# Patient Record
Sex: FEMALE | Race: WHITE | Employment: PART TIME | URBAN - METROPOLITAN AREA
[De-identification: names, ages, dates, MRNs, and addresses within clinical notes are randomized per-mention and may not be internally consistent; named-entity substitution may affect disease eponyms.]

---

## 2017-02-21 ENCOUNTER — TRANSFERRED RECORDS (OUTPATIENT)
Dept: HEALTH INFORMATION MANAGEMENT | Facility: CLINIC | Age: 62
End: 2017-02-21

## 2017-10-03 ENCOUNTER — TRANSFERRED RECORDS (OUTPATIENT)
Dept: HEALTH INFORMATION MANAGEMENT | Facility: CLINIC | Age: 62
End: 2017-10-03

## 2018-10-30 ENCOUNTER — TELEPHONE (OUTPATIENT)
Dept: ALLERGY | Facility: OTHER | Age: 63
End: 2018-10-30

## 2018-10-30 ENCOUNTER — OFFICE VISIT (OUTPATIENT)
Dept: ALLERGY | Facility: OTHER | Age: 63
End: 2018-10-30
Payer: COMMERCIAL

## 2018-10-30 VITALS
OXYGEN SATURATION: 95 % | SYSTOLIC BLOOD PRESSURE: 106 MMHG | HEART RATE: 75 BPM | DIASTOLIC BLOOD PRESSURE: 60 MMHG | TEMPERATURE: 99.3 F

## 2018-10-30 DIAGNOSIS — L50.9 HIVES: Primary | ICD-10-CM

## 2018-10-30 DIAGNOSIS — L20.89 OTHER ATOPIC DERMATITIS: ICD-10-CM

## 2018-10-30 PROCEDURE — 99204 OFFICE O/P NEW MOD 45 MIN: CPT | Performed by: ALLERGY & IMMUNOLOGY

## 2018-10-30 RX ORDER — TRIAMCINOLONE ACETONIDE 1 MG/G
CREAM TOPICAL
Qty: 454 G | Refills: 1 | Status: SHIPPED | OUTPATIENT
Start: 2018-10-30

## 2018-10-30 NOTE — PATIENT INSTRUCTIONS
Allergy Staff Appt Hours Shot Hours Locations    Physician     Alan Moyer, DO       Support Staff     Jerrica DANIELSON RN      Melinda DANIELSON, [facility]  Monday:                      [place] 8-7     Tuesday:         [place] 8-5     Wednesday:        [place]: 7-5     Friday:        [place] 7-5   [place]        Monday: 9-5:50        Wednesday: 2-5:50        Friday: 7-12:50     [place]        Tuesday: 7-10:50        Thursday: 1:30-6:30     [place] Monday: 7:10-4:50        Tuesday: 12:30-6:30        Thursday: 7-11:50 [facility]  [address] [place] [address]  Appt Line: [phone]  Allergy RN (Monday):  [phone]    [facility]  [address] [place] [address]  Appt Line: [phone]  Allergy RN (Tues & Wed):  [phone]    [facility]  [address] [place] [address]  Appt Line: [phone]  Allergy RN (Friday):  [phone]       Important Scheduling Information  Aspirin Desensitization: Appt will last 2 clinic days. Please call the Allergy RN line for your clinic to schedule. Discontinue antihistamines 7 days prior to the appointment.     Food Challenges: Appt will last 3-4 hours. Please call the Allergy RN line for your clinic to schedule. Discontinue antihistamines 7 days prior to the appointment.     Penicillin Testing: Appt will last 2-3 hours. Please call the Allergy RN line for your clinic to schedule. Discontinue antihistamines 7 days prior to the appointment.     Skin Testing: Appt will about 40 minutes. Call the appointment line for your clinic to schedule. Discontinue antihistamines 7 days prior to the appointment.     Venom Testing: Appt will last 2-3 hours. Please call the Allergy RN line for your clinic to schedule. Discontinue antihistamines 7 days prior to the appointment.     Thank you for trusting us with your Allergy, Asthma, and Immunology care. Please feel free to contact us with any questions or concerns you may have.      - Return to clinic  for food allergy testing. Please bring in seasoning that suspect is implicated in reaction.  Hold Zyrtec, Allegra, Claritin, Benadryl for 7 days prior to skin testing appointment.   - See eczema treatment instructions noted below.  - Stop using Young Living products. Use all fragrance free products.      Eczema Treatment Instructions     Moisturize the skin: This is the first and most important step.  Thick, greasy ointments work best: Vaseline jelly, Eucerin, Aquaphor, etc.    Apply to entire body at least twice a day, up to several times per day when the air is dry (as in late fall, winter, early spring)    If using steroid ointments, apply these first; allow to dry before applying moisturizer over the steroid ointment.    Bathing:  Bathe DAILY.    Use as little soap as possible, and very mild soaps.  Wash dirty areas with soap first, rinse off the soap, then fill the tub with clean water.    Soak in lukewarm water for 20-30 minutes    Pat dry gently, and immediately apply moisturizer while the skin is still damp    Steroid ointments:    Hydrocortisone, 1% for rashes on the face and groin area.    Stronger steroid ointment for the rest of the body:Triamcinolone 0.1% cream. Apply twice daily, only to areas of rash (do not use the steroid ointment as a moisturizer).           Antihistamine treatment: important to help prevent itching/scratching    Zyrtec 10mg at bedtime         Avoidance measures: Avoid exposure to things that make eczema worse   Dust mite   Rough or scratchy clothing    Harsh soaps or detergents  AEROALLERGEN AVOIDANCE INSTRUCTIONS  MOLD  Indoors, mold season is year round. Outdoors, most mold prefer seasons with high humidity. Mold prefers damp, dark, warm places. Here are some tips on how to avoid mold exposure.  1.  Keep humidity inside between 35-50% with air conditioning or dehumidifier. The humidity level can be checked with a meter from a hardware store.   2.  Clean surfaces where mold grows  and dry wet areas.  3.  Avoid steam cleaning carpets and discard moldy belongings.  4.  Wear a mask when doing yard work and refrain from walking through uncut fields or playing in leaves.  5.  Minimize use of potted plants and do not keep them indoors.  6.  Consider an allergy cover for the pillow and mattress.  DUST MITES  Dust mites can never be entirely eliminated in the house no matter how clean your house is. Dust mites are attracted to warm, moist areas and feed on dead skin flakes. Here are tips to minimize dust mites in your home.  1.  Encase pillows and mattress/box springs in zippered allergy covers.  2.  Wash bedding in hot water (at least 130 F) every 7-14 days.  3.  Avoid curtains, carpet, and upholstered furniture if possible.  4.  Use HEPA air filters and a HEPA filter vacuum . Change filters monthly. Vacuum weekly.  5.  Keep bedroom simple, avoiding clutter, so it can quickly be dusted.  6.  Cover heating vents with vent filters.  7.  Keep stuffed toys in a closed container and wash or freeze regularly.  8.  Keep clothing in the closet with the door closed.  PETS  Pets present many problems for people with allergies. Dander from pets is very difficult to remove and also is a food source for dust mites.  1.  If possible, find the pet a new home.  2.  If not possible, keep the pet outdoors. Never allow the pet into the bedroom.  3.  Wash pet weekly in warm water.  4.  Encase mattresses, pillows, and box springs in allergen-proof covers.  5.  Use HEPA air filters and a HEPA filter vacuum . Change filters monthly.

## 2018-10-30 NOTE — ASSESSMENT & PLAN NOTE
Raised, erythematous, bumpy rash on chest that occurred within 20 minutes if eating at a taco bar which contained beef, chicken, lettuce, tomato, corn, flour and taco seasoning.  Rash persisted 1.5 weeks.  Rash improved when she started taking Zyrtec.  No scarring or discoloration.  This rash was different than eczematous rash that she has dealt with for 4 years.  Subsequently she has tolerated beef, chicken.  She is concerned about taco seasoning being the culprit.  No other IgE mediated symptoms.  Unclear if rash was IgE mediated.  Did occur immediately after consuming these foods without other clear etiology but persistence for 1.5 weeks would be unusual.    -I am going to have the patient return to clinic for allergy testing for potential foods implicated including taco seasoning.  Could not do today secondary to antihistamine use.  Continue to avoid potential implicated foods until after testing.

## 2018-10-30 NOTE — TELEPHONE ENCOUNTER
Release of information was faxed on 10/30/18 at 16:14 PM to patient's previous allergy office, [facility] (Dr. Kessler), @ [phone], located in [place], [place]. The phone number for this location is [phone]. Requested that records be faxed to [facility] @ [phone]. This patient is not transferring care to our office for allergen immunotherapy injections. Specific items requested include clinic/physician notes. Will follow up in two weeks if records have not been received.     Jerrica Almazan RN

## 2018-10-30 NOTE — PROGRESS NOTES
Monika Luis is a 63 year old White female with previous medical history significant for eczema. Monika Luis is being seen today for evaluation of eczema.     Patient presents as a new patient.  She has been seen by an allergist in 2017.  She at that time was having erythematous, rough, dry skin rash and had allergy testing done which showed she was positive for dust mites, molds and cats.  She has been using triamcinolone 0.1% cream on an as-needed basis.  She has found this to be beneficial.  She historically has been using young living oils and homemade oils as an emollient.  She switched to Cerave recently and has been using 2-3 times per day.  She is still using young living supplements, diffusers and occasionally a topical oil. She thinks that young living products exacerbate her skin.  She will get rash on arms, legs, face and body.  This is been prevalent for the last 4 years and occurs every day.  On her hands she will get cracking, vesicles and pruritic dermatitis.  Detergent contains fragrance.  She is using young living shampoo and soap.  She reports that recently she had a different rash on her chest that she describes as erythematous, itchy bumps that occurred after eating Mexican food (tacos) which consisted of beef, chicken, lettuce, tomato, flower tortilla shells, corn tortilla shells, taco seasoning.  Subsequently she has had chicken and beef.  Rash persisted for 1.5 weeks.  Rash occurred within 20 minutes of eating.  No history of IgE mediated food allergy.  No other IgE mediated symptoms.  She denies rhinorrhea, nasal itching, sneezing.  She denies asthma.  She denies other food allergy concerns.    History reviewed. No pertinent past medical history.  History reviewed. No pertinent family history.  History reviewed. No pertinent surgical history.    REVIEW OF SYSTEMS:  General: negative for weight gain. negative for weight loss. negative for changes in sleep.   Ears: negative for  fullness. negative for hearing loss. negative for dizziness.   Nose: positive  for snoring.positive  for changes in smell. negative for drainage.   Eyes: negative for eye watering. negative for eye itching. negative for vision changes. negative for eye redness.  Throat: negative for hoarseness. negative for sore throat. negative for trouble swallowing.   Lungs: negative for shortness of breath.negative for wheezing. negative for sputum production.   Cardiovascular: negative for chest pain. negative for swelling of ankles. negative for fast or irregular heartbeat.   Gastrointestinal: negative for nausea. negative for heartburn. negative for acid reflux.   Musculoskeletal: positive  for joint pain. positive  for joint stiffness. positive  for joint swelling.   Neurologic: negative for seizures. negative for fainting. negative for weakness.   Psychiatric: negative for changes in mood. negative for anxiety.   Endocrine: negative for cold intolerance. negative for heat intolerance. negative for tremors.   Lymphatic: negative for lower extremity swelling. negative for lymph node swelling.   Hematologic: positive  for easy bruising. negative for easy bleeding.  Integumentary: positive  for rash. positive  for scaling. negative for nail changes.       Current Outpatient Prescriptions:      DiphenhydrAMINE HCl (BENADRYL PO), Take 50 mg by mouth daily as needed, Disp: , Rfl:      triamcinolone (KENALOG) 0.1 % cream, Apply sparingly to affected area two times daily as needed, Disp: 454 g, Rfl: 1     Tea Tree Oil OIL, Combined with clove, Disp: , Rfl:   Immunization History   Administered Date(s) Administered     Influenza (IIV3) PF 11/04/2006     Allergies   Allergen Reactions     Contrast Dye Anaphylaxis         EXAM:   Constitutional:  Appears well-developed and well-nourished. No distress.   HEENT:   Head: Normocephalic.   Mouth/Throat:   No cobblestoning of posterior oropharynx.   Nasal tissue pink and normal  appearing.  No rhinorrhea noted.    Eyes: Conjunctivae are non-erythematous   No maxillary or frontal sinus tenderness to palpation.   Cardiovascular: Normal rate, regular rhythm and normal heart sounds. Exam reveals no gallop and no friction rub.   No murmur heard.  Respiratory: Effort normal and breath sounds normal. No respiratory distress. No wheezes. No rales.   Musculoskeletal: Normal range of motion.   Lymphadenopathy:   No cervical adenopathy.   No lower extremity edema.   Neuro: Oriented to person, place, and time.  Skin: Rough, dry, erythematous, vesicular rash noted on palmar surface of right thumb, lateral surfaces of multiple fingers bilaterally.  She additionally had rough, dry, erythematous rash noted on bilateral upper extremities.  Dry, erythematous, rough rash noted minimally on chest.  Psychiatric: Normal mood and affect.     Nursing note and vitals reviewed.        ASSESSMENT/PLAN:  Problem List Items Addressed This Visit        Musculoskeletal and Integumentary    Other atopic dermatitis     Dermatitis that is rough, erythematous, dry and itching for the last 4 years. Present most days. On arms, legs and chest. Triamcinolone 0.1% cream used and helpful. Using Cerave 2-3 times daily. Using Young Living Products and supplements. She thinks this makes her skin worse. No dermatology evaluation. Per her history skin testing done by outside allergist positive for dust mites, molds, and cats.  Dry, cracked, vesicular rash on hands concerning for dyshidrotic eczema.    - Eczema treatment instructions were discussed with the patient and literature provided.    - Daily bathing.   - Use of thick emollient such as Eucerin, Aquaphor, Vanicream or Vaseline 2-3 times daily.   - Soak and seal technique was discussed.    - Avoid harsh soaps and detergents. Use fragrance free.    - Triamcinolone 0.1% cream bid to active eczema on body.    - Hydrocortisone 1% cream bid to active eczema on face.    - Zyrtec 10mg PO  daily for itching control   - Referral to dermatology to consider patch testing given fragrances exacerbate her symptoms.    - Stop using Young Living products.    - Allergen avoidance measures discussed based on what she told me she was positive for on allergy testing.    - Sign release of information to get records from outside allergist.            Relevant Medications    triamcinolone (KENALOG) 0.1 % cream    DiphenhydrAMINE HCl (BENADRYL PO)    Other Relevant Orders    DERMATOLOGY REFERRAL    Hives - Primary     Raised, erythematous, bumpy rash on chest that occurred within 20 minutes if eating at a taco bar which contained beef, chicken, lettuce, tomato, corn, flour and taco seasoning.  Rash persisted 1.5 weeks.  Rash improved when she started taking Zyrtec.  No scarring or discoloration.  This rash was different than eczematous rash that she has dealt with for 4 years.  Subsequently she has tolerated beef, chicken.  She is concerned about taco seasoning being the culprit.  No other IgE mediated symptoms.  Unclear if rash was IgE mediated.  Did occur immediately after consuming these foods without other clear etiology but persistence for 1.5 weeks would be unusual.    -I am going to have the patient return to clinic for allergy testing for potential foods implicated including taco seasoning.  Could not do today secondary to antihistamine use.  Continue to avoid potential implicated foods until after testing.             Relevant Medications    triamcinolone (KENALOG) 0.1 % cream    DiphenhydrAMINE HCl (BENADRYL PO)          Chart documentation with Dragon Voice recognition Software. Although reviewed after completion, some words and grammatical errors may remain.    Alan Moyer,    Allergy/Immunology  [facility], [place] and [place], [place]

## 2018-10-30 NOTE — MR AVS SNAPSHOT
After Visit Summary   10/30/2018    Monika Luis    MRN: [id number]           Patient Information     Date Of Birth          1955        Visit Information        Provider Department      10/30/2018 3:20 PM Alan Moyer DO [facility]        Today's Diagnoses     Other atopic dermatitis          Care Instructions    Allergy Staff Appt Hours Shot Hours Locations    Physician     Alan Moyer DO       Support Staff     Jerrica DANIELSON RN      Melinda DANIELSON, [facility]  Monday:                      [place] 8-7     Tuesday:         [place] 8-5     Wednesday:        [place]: 7-5     Friday:        [place] 7-5   [place] Monday: 9-5:50        Wednesday: 2-5:50        Friday: 7-12:50     [place]        Tuesday: 7-10:50        Thursday: 1:30-6:30     [place] Monday: 7:10-4:50        Tuesday: 12:30-6:30        Thursday: 7-11:50 [facility]  [address] [place] [address]  Appt Line: [phone]  Allergy RN (Monday):  [phone]    [facility]  [address] [place] [address]  Appt Line: [phone]  Allergy RN (Tues & Wed):  [phone]    [facility]  [address] [place] [address]  Appt Line: [phone]  Allergy RN (Friday):  [phone]       Important Scheduling Information  Aspirin Desensitization: Appt will last 2 clinic days. Please call the Allergy RN line for your clinic to schedule. Discontinue antihistamines 7 days prior to the appointment.     Food Challenges: Appt will last 3-4 hours. Please call the Allergy RN line for your clinic to schedule. Discontinue antihistamines 7 days prior to the appointment.     Penicillin Testing: Appt will last 2-3 hours. Please call the Allergy RN line for your clinic to schedule. Discontinue antihistamines 7 days prior to the appointment.     Skin Testing: Appt will about 40 minutes. Call the appointment line for your clinic to schedule. Discontinue antihistamines 7 days prior  to the appointment.     Venom Testing: Appt will last 2-3 hours. Please call the Allergy RN line for your clinic to schedule. Discontinue antihistamines 7 days prior to the appointment.     Thank you for trusting us with your Allergy, Asthma, and Immunology care. Please feel free to contact us with any questions or concerns you may have.      - Return to clinic for food allergy testing. Please bring in seasoning that suspect is implicated in reaction.  Hold Zyrtec, Allegra, Claritin, Benadryl for 7 days prior to skin testing appointment.   - See eczema treatment instructions noted below.  - Stop using Young Living products. Use all fragrance free products.      Eczema Treatment Instructions     Moisturize the skin: This is the first and most important step.  Thick, greasy ointments work best: Vaseline jelly, Eucerin, Aquaphor, etc.    Apply to entire body at least twice a day, up to several times per day when the air is dry (as in late fall, winter, early spring)    If using steroid ointments, apply these first; allow to dry before applying moisturizer over the steroid ointment.    Bathing:  Bathe DAILY.    Use as little soap as possible, and very mild soaps.  Wash dirty areas with soap first, rinse off the soap, then fill the tub with clean water.    Soak in lukewarm water for 20-30 minutes    Pat dry gently, and immediately apply moisturizer while the skin is still damp    Steroid ointments:    Hydrocortisone, 1% for rashes on the face and groin area.    Stronger steroid ointment for the rest of the body:Triamcinolone 0.1% cream. Apply twice daily, only to areas of rash (do not use the steroid ointment as a moisturizer).           Antihistamine treatment: important to help prevent itching/scratching    Zyrtec 10mg at bedtime         Avoidance measures: Avoid exposure to things that make eczema worse   Dust mite   Rough or scratchy clothing    Harsh soaps or detergents  AEROALLERGEN AVOIDANCE  INSTRUCTIONS  MOLD  Indoors, mold season is year round. Outdoors, most mold prefer seasons with high humidity. Mold prefers damp, dark, warm places. Here are some tips on how to avoid mold exposure.  1.  Keep humidity inside between 35-50% with air conditioning or dehumidifier. The humidity level can be checked with a meter from a hardware store.   2.  Clean surfaces where mold grows and dry wet areas.  3.  Avoid steam cleaning carpets and discard moldy belongings.  4.  Wear a mask when doing yard work and refrain from walking through uncut fields or playing in leaves.  5.  Minimize use of potted plants and do not keep them indoors.  6.  Consider an allergy cover for the pillow and mattress.  DUST MITES  Dust mites can never be entirely eliminated in the house no matter how clean your house is. Dust mites are attracted to warm, moist areas and feed on dead skin flakes. Here are tips to minimize dust mites in your home.  1.  Encase pillows and mattress/box springs in zippered allergy covers.  2.  Wash bedding in hot water (at least 130 F) every 7-14 days.  3.  Avoid curtains, carpet, and upholstered furniture if possible.  4.  Use HEPA air filters and a HEPA filter vacuum . Change filters monthly. Vacuum weekly.  5.  Keep bedroom simple, avoiding clutter, so it can quickly be dusted.  6.  Cover heating vents with vent filters.  7.  Keep stuffed toys in a closed container and wash or freeze regularly.  8.  Keep clothing in the closet with the door closed.  PETS  Pets present many problems for people with allergies. Dander from pets is very difficult to remove and also is a food source for dust mites.  1.  If possible, find the pet a new home.  2.  If not possible, keep the pet outdoors. Never allow the pet into the bedroom.  3.  Wash pet weekly in warm water.  4.  Encase mattresses, pillows, and box springs in allergen-proof covers.  5.  Use HEPA air filters and a HEPA filter vacuum . Change filters  monthly.                Follow-ups after your visit        Additional Services     DERMATOLOGY REFERRAL       Your provider has referred you to: [facility] [phone]   [email or web address]  [place] [phone]   [email or web address]    Please be aware that coverage of these services is subject to the terms and limitations of your health insurance plan.  Call member services at your health plan with any benefit or coverage questions.      Please bring the following with you to your appointment:    (1) Any X-Rays, CTs or MRIs which have been performed.  Contact the facility where they were done to arrange for  prior to your scheduled appointment.    (2) List of current medications  (3) This referral request   (4) Any documents/labs given to you for this referral                  Who to contact     If you have questions or need follow up information about today's clinic visit or your schedule please contact [facility] directly at [phone].  Normal or non-critical lab and imaging results will be communicated to you by MyChart, letter or phone within 4 business days after the clinic has received the results. If you do not hear from us within 7 days, please contact the clinic through MyChart or phone. If you have a critical or abnormal lab result, we will notify you by phone as soon as possible.  Submit refill requests through [employer] or call your pharmacy and they will forward the refill request to us. Please allow 3 business days for your refill to be completed.          Additional Information About Your Visit        Care EveryWhere ID     This is your Care EveryWhere ID. This could be used by other organizations to access your [facility] medical records  [id number]        Your Vitals Were     Pulse Temperature Pulse Oximetry             75 99.3  F (37.4  C) (Oral) 95%          Blood Pressure from Last 3 Encounters:    10/30/18 106/60   06/18/16 116/72    Weight from Last 3 Encounters:   No data found for Wt              We Performed the Following     DERMATOLOGY REFERRAL          Today's Medication Changes          These changes are accurate as of 10/30/18  4:12 PM.  If you have any questions, ask your nurse or doctor.               These medicines have changed or have updated prescriptions.        Dose/Directions    triamcinolone 0.1 % cream   Commonly known as:  KENALOG   This may have changed:  additional instructions   Used for:  Other atopic dermatitis   Changed by:  Alan Moyer, DO        Apply sparingly to affected area two times daily as needed   Quantity:  454 g   Refills:  1            Where to get your medicines      These medications were sent to [facility] [address] [place] [address]     Phone:  [phone]     triamcinolone 0.1 % cream                Primary Care Provider Fax #    Physician No Ref-Primary [phone]       No address on file        Equal Access to Services     RUKHSANA CABRAL AH: Hadii aad ku hadasho Soomaali, waaxda luqadaha, qaybta kaalmada adeegyada, faviola shanks . So [facility] [phone].    ATENCIÓN: Si habla español, tiene a rodas disposición servicios gratuitos de asistencia lingüística. Llame al [phone].    We comply with applicable federal civil rights laws and [place] laws. We do not discriminate on the basis of race, color, national origin, age, disability, sex, sexual orientation, or gender identity.            Thank you!     Thank you for choosing [facility]  for your care. Our goal is always to provide you with excellent care. Hearing back from our patients is one way we can continue to improve our services. Please take a few minutes to complete the written survey that you may receive in the mail after your visit with us. Thank you!             Your Updated Medication List -  Protect others around you: Learn how to safely use, store and throw away your medicines at www.disposemymeds.org.          This list is accurate as of 10/30/18  4:12 PM.  Always use your most recent med list.                   Brand Name Dispense Instructions for use Diagnosis    BENADRYL PO      Take 50 mg by mouth daily as needed        Tea Tree Oil Oil      Combined with clove        triamcinolone 0.1 % cream    KENALOG    454 g    Apply sparingly to affected area two times daily as needed    Other atopic dermatitis

## 2018-10-30 NOTE — ASSESSMENT & PLAN NOTE
Dermatitis that is rough, erythematous, dry and itching for the last 4 years. Present most days. On arms, legs and chest. Triamcinolone 0.1% cream used and helpful. Using Cerave 2-3 times daily. Using Young Living Products and supplements. She thinks this makes her skin worse. No dermatology evaluation. Per her history skin testing done by outside allergist positive for dust mites, molds, and cats.  Dry, cracked, vesicular rash on hands concerning for dyshidrotic eczema.    - Eczema treatment instructions were discussed with the patient and literature provided.    - Daily bathing.   - Use of thick emollient such as Eucerin, Aquaphor, Vanicream or Vaseline 2-3 times daily.   - Soak and seal technique was discussed.    - Avoid harsh soaps and detergents. Use fragrance free.    - Triamcinolone 0.1% cream bid to active eczema on body.    - Hydrocortisone 1% cream bid to active eczema on face.    - Zyrtec 10mg PO daily for itching control   - Referral to dermatology to consider patch testing given fragrances exacerbate her symptoms.    - Stop using Young Living products.    - Allergen avoidance measures discussed based on what she told me she was positive for on allergy testing.    - Sign release of information to get records from outside allergist.

## 2018-10-30 NOTE — LETTER
10/30/2018         RE: Monika Luis  [address] [place] [address]        Dear Colleague,    Thank you for referring your patient, Monika Luis, to the [facility]. Please see a copy of my visit note below.    Monika Luis is a 63 year old White female with previous medical history significant for eczema. Monika Luis is being seen today for evaluation of eczema.     Patient presents as a new patient.  She has been seen by an allergist in 2017.  She at that time was having erythematous, rough, dry skin rash and had allergy testing done which showed she was positive for dust mites, molds and cats.  She has been using triamcinolone 0.1% cream on an as-needed basis.  She has found this to be beneficial.  She historically has been using young living oils and homemade oils as an emollient.  She switched to Cerave recently and has been using 2-3 times per day.  She is still using young living supplements, diffusers and occasionally a topical oil. She thinks that young living products exacerbate her skin.  She will get rash on arms, legs, face and body.  This is been prevalent for the last 4 years and occurs every day.  On her hands she will get cracking, vesicles and pruritic dermatitis.  Detergent contains fragrance.  She is using young living shampoo and soap.  She reports that recently she had a different rash on her chest that she describes as erythematous, itchy bumps that occurred after eating Mexican food (tacos) which consisted of beef, chicken, lettuce, tomato, flower tortilla shells, corn tortilla shells, taco seasoning.  Subsequently she has had chicken and beef.  Rash persisted for 1.5 weeks.  Rash occurred within 20 minutes of eating.  No history of IgE mediated food allergy.  No other IgE mediated symptoms.  She denies rhinorrhea, nasal itching, sneezing.  She denies asthma.  She denies other food allergy concerns.    History reviewed. No pertinent past medical  history.  History reviewed. No pertinent family history.  History reviewed. No pertinent surgical history.    REVIEW OF SYSTEMS:  General: negative for weight gain. negative for weight loss. negative for changes in sleep.   Ears: negative for fullness. negative for hearing loss. negative for dizziness.   Nose: positive  for snoring.positive  for changes in smell. negative for drainage.   Eyes: negative for eye watering. negative for eye itching. negative for vision changes. negative for eye redness.  Throat: negative for hoarseness. negative for sore throat. negative for trouble swallowing.   Lungs: negative for shortness of breath.negative for wheezing. negative for sputum production.   Cardiovascular: negative for chest pain. negative for swelling of ankles. negative for fast or irregular heartbeat.   Gastrointestinal: negative for nausea. negative for heartburn. negative for acid reflux.   Musculoskeletal: positive  for joint pain. positive  for joint stiffness. positive  for joint swelling.   Neurologic: negative for seizures. negative for fainting. negative for weakness.   Psychiatric: negative for changes in mood. negative for anxiety.   Endocrine: negative for cold intolerance. negative for heat intolerance. negative for tremors.   Lymphatic: negative for lower extremity swelling. negative for lymph node swelling.   Hematologic: positive  for easy bruising. negative for easy bleeding.  Integumentary: positive  for rash. positive  for scaling. negative for nail changes.       Current Outpatient Prescriptions:      DiphenhydrAMINE HCl (BENADRYL PO), Take 50 mg by mouth daily as needed, Disp: , Rfl:      triamcinolone (KENALOG) 0.1 % cream, Apply sparingly to affected area two times daily as needed, Disp: 454 g, Rfl: 1     Tea Tree Oil OIL, Combined with clove, Disp: , Rfl:   Immunization History   Administered Date(s) Administered     Influenza (IIV3) PF 11/04/2006     Allergies   Allergen Reactions      Contrast Dye Anaphylaxis         EXAM:   Constitutional:  Appears well-developed and well-nourished. No distress.   HEENT:   Head: Normocephalic.   Mouth/Throat:   No cobblestoning of posterior oropharynx.   Nasal tissue pink and normal appearing.  No rhinorrhea noted.    Eyes: Conjunctivae are non-erythematous   No maxillary or frontal sinus tenderness to palpation.   Cardiovascular: Normal rate, regular rhythm and normal heart sounds. Exam reveals no gallop and no friction rub.   No murmur heard.  Respiratory: Effort normal and breath sounds normal. No respiratory distress. No wheezes. No rales.   Musculoskeletal: Normal range of motion.   Lymphadenopathy:   No cervical adenopathy.   No lower extremity edema.   Neuro: Oriented to person, place, and time.  Skin: Rough, dry, erythematous, vesicular rash noted on palmar surface of right thumb, lateral surfaces of multiple fingers bilaterally.  She additionally had rough, dry, erythematous rash noted on bilateral upper extremities.  Dry, erythematous, rough rash noted minimally on chest.  Psychiatric: Normal mood and affect.     Nursing note and vitals reviewed.        ASSESSMENT/PLAN:  Problem List Items Addressed This Visit        Musculoskeletal and Integumentary    Other atopic dermatitis     Dermatitis that is rough, erythematous, dry and itching for the last 4 years. Present most days. On arms, legs and chest. Triamcinolone 0.1% cream used and helpful. Using Cerave 2-3 times daily. Using Young Living Products and supplements. She thinks this makes her skin worse. No dermatology evaluation. Per her history skin testing done by outside allergist positive for dust mites, molds, and cats.  Dry, cracked, vesicular rash on hands concerning for dyshidrotic eczema.    - Eczema treatment instructions were discussed with the patient and literature provided.    - Daily bathing.   - Use of thick emollient such as Eucerin, Aquaphor, Vanicream or Vaseline 2-3 times daily.   -  Soak and seal technique was discussed.    - Avoid harsh soaps and detergents. Use fragrance free.    - Triamcinolone 0.1% cream bid to active eczema on body.    - Hydrocortisone 1% cream bid to active eczema on face.    - Zyrtec 10mg PO daily for itching control   - Referral to dermatology to consider patch testing given fragrances exacerbate her symptoms.    - Stop using Young Living products.    - Allergen avoidance measures discussed based on what she told me she was positive for on allergy testing.    - Sign release of information to get records from outside allergist.            Relevant Medications    triamcinolone (KENALOG) 0.1 % cream    DiphenhydrAMINE HCl (BENADRYL PO)    Other Relevant Orders    DERMATOLOGY REFERRAL    Hives - Primary     Raised, erythematous, bumpy rash on chest that occurred within 20 minutes if eating at a taco bar which contained beef, chicken, lettuce, tomato, corn, flour and taco seasoning.  Rash persisted 1.5 weeks.  Rash improved when she started taking Zyrtec.  No scarring or discoloration.  This rash was different than eczematous rash that she has dealt with for 4 years.  Subsequently she has tolerated beef, chicken.  She is concerned about taco seasoning being the culprit.  No other IgE mediated symptoms.  Unclear if rash was IgE mediated.  Did occur immediately after consuming these foods without other clear etiology but persistence for 1.5 weeks would be unusual.    -I am going to have the patient return to clinic for allergy testing for potential foods implicated including taco seasoning.  Could not do today secondary to antihistamine use.  Continue to avoid potential implicated foods until after testing.             Relevant Medications    triamcinolone (KENALOG) 0.1 % cream    DiphenhydrAMINE HCl (BENADRYL PO)          Chart documentation with Dragon Voice recognition Software. Although reviewed after completion, some words and grammatical errors may remain.    Alan AMARO  DO João   Allergy/Immunology  [facility], [place] and [place], [place]        Again, thank you for allowing me to participate in the care of your patient.        Sincerely,        Alan Moyer, DO

## 2018-11-13 NOTE — TELEPHONE ENCOUNTER
Called to check status of records and was told they were housed at the [facility] location.  They transferred me to this location, and representative told me they did receive the request.  This is still in process.    Jerrica Almazan RN

## 2018-11-27 NOTE — TELEPHONE ENCOUNTER
Called [facility] ([facility] location) to check on records.  Was told by representative that these were faxed yesterday.  We have not received the records.  Asked to please refax them to [facility] at [phone].    Jerrica Almazan RN

## 2018-12-04 ENCOUNTER — DOCUMENTATION ONLY (OUTPATIENT)
Dept: ALLERGY | Facility: OTHER | Age: 63
End: 2018-12-04

## 2018-12-04 NOTE — TELEPHONE ENCOUNTER
Records received.  Paperwork reviewed by provider and will be sent to scanning.  Closing encounter.    Jerrica Almazan RN

## 2018-12-04 NOTE — PROGRESS NOTES
Reviewed outside records from allergy and asthma specialist.  Positive skin testing for cat, dust mite, molds.  Records will be scanned into electronic medical record.

## 2019-01-01 ENCOUNTER — NURSE TRIAGE (OUTPATIENT)
Dept: NURSING | Facility: CLINIC | Age: 64
End: 2019-01-01

## 2019-01-01 ENCOUNTER — HOSPITAL ENCOUNTER (EMERGENCY)
Facility: CLINIC | Age: 64
Discharge: HOME | End: 2019-01-01
Attending: FAMILY MEDICINE | Admitting: FAMILY MEDICINE
Payer: COMMERCIAL

## 2019-01-01 VITALS
BODY MASS INDEX: 19.97 KG/M2 | HEIGHT: 64 IN | WEIGHT: 117 LBS | DIASTOLIC BLOOD PRESSURE: 94 MMHG | TEMPERATURE: 98.5 F | SYSTOLIC BLOOD PRESSURE: 144 MMHG | OXYGEN SATURATION: 99 % | RESPIRATION RATE: 20 BRPM

## 2019-01-01 DIAGNOSIS — R31.9 URINARY TRACT INFECTION WITH HEMATURIA, SITE UNSPECIFIED: ICD-10-CM

## 2019-01-01 DIAGNOSIS — N39.0 URINARY TRACT INFECTION WITH HEMATURIA, SITE UNSPECIFIED: ICD-10-CM

## 2019-01-01 LAB
ALBUMIN UR-MCNC: 100 MG/DL
APPEARANCE UR: ABNORMAL
BILIRUB UR QL STRIP: NEGATIVE
COLOR UR AUTO: ABNORMAL
GLUCOSE UR STRIP-MCNC: NEGATIVE MG/DL
HGB UR QL STRIP: ABNORMAL
KETONES UR STRIP-MCNC: NEGATIVE MG/DL
LEUKOCYTE ESTERASE UR QL STRIP: ABNORMAL
NITRATE UR QL: NEGATIVE
PH UR STRIP: 5 PH (ref 5–7)
RBC #/AREA URNS AUTO: >182 /HPF (ref 0–2)
SOURCE: ABNORMAL
SP GR UR STRIP: 1.02 (ref 1–1.03)
UROBILINOGEN UR STRIP-MCNC: 0 MG/DL (ref 0–2)
WBC #/AREA URNS AUTO: >182 /HPF (ref 0–5)
WBC CLUMPS #/AREA URNS HPF: PRESENT /HPF
YEAST #/AREA URNS HPF: ABNORMAL /HPF

## 2019-01-01 PROCEDURE — 87186 SC STD MICRODIL/AGAR DIL: CPT | Performed by: FAMILY MEDICINE

## 2019-01-01 PROCEDURE — 99283 EMERGENCY DEPT VISIT LOW MDM: CPT | Performed by: FAMILY MEDICINE

## 2019-01-01 PROCEDURE — 87086 URINE CULTURE/COLONY COUNT: CPT | Performed by: FAMILY MEDICINE

## 2019-01-01 PROCEDURE — 81001 URINALYSIS AUTO W/SCOPE: CPT | Performed by: FAMILY MEDICINE

## 2019-01-01 PROCEDURE — 99284 EMERGENCY DEPT VISIT MOD MDM: CPT | Mod: Z6 | Performed by: FAMILY MEDICINE

## 2019-01-01 PROCEDURE — 87088 URINE BACTERIA CULTURE: CPT | Performed by: FAMILY MEDICINE

## 2019-01-01 RX ORDER — CIPROFLOXACIN 250 MG/1
250 TABLET, FILM COATED ORAL 2 TIMES DAILY
Qty: 14 TABLET | Refills: 0 | Status: SHIPPED | OUTPATIENT
Start: 2019-01-01 | End: 2019-01-04 | Stop reason: ALTCHOICE

## 2019-01-01 RX ORDER — PHENAZOPYRIDINE HYDROCHLORIDE 200 MG/1
200 TABLET, FILM COATED ORAL 3 TIMES DAILY PRN
Qty: 6 TABLET | Refills: 0 | Status: SHIPPED | OUTPATIENT
Start: 2019-01-01 | End: 2019-01-03

## 2019-01-01 ASSESSMENT — MIFFLIN-ST. JEOR: SCORE: 1070.71

## 2019-01-01 NOTE — ED PROVIDER NOTES
"                                                            ED Provider Note     Monika Luis  [id number]  January 1, 2019      CC:     Chief Complaint   Patient presents with     Urinary Frequency          History is obtained from the patient.    HPI: Monika Luis is a 63 year old female presenting with abrupt onset of gross hematuria with dysuria at around 130 this morning.  Patient states that she is able to tell when an infection is about the start but it is somewhat vague.  She sensed it coming on last night, but woke up at around 1 AM with hematuria, and at 3:00 with more bloody urine.  She denies any fever, chills, nausea, vomiting, back or flank pain.  She had a mild fever last Wednesday related to an upper respiratory infection which is improving.  Her last urinary tract infection was a year and a half ago.  She has not found any triggers.      PMH/Problem List:   History reviewed. No pertinent past medical history.    PSH: History reviewed. No pertinent surgical history.    MEDS:     No current facility-administered medications on file prior to encounter.   Current Outpatient Medications on File Prior to Encounter:  DiphenhydrAMINE HCl (BENADRYL PO) Take 50 mg by mouth daily as needed   Tea Tree Oil OIL Combined with clove   triamcinolone (KENALOG) 0.1 % cream Apply sparingly to affected area two times daily as needed       Allergies: Contrast dye    Triage and nursing notes were reviewed.    ROS: All other systems were reviewed and are negative    Physical Exam:  Vitals:    01/01/19 0558 01/01/19 0640   BP: (!) 144/94    Resp: 20 20   Temp: 98.5  F (36.9  C)    TempSrc: Oral    SpO2: 99%    Weight: 53.1 kg (117 lb)    Height: 1.626 m (5' 4\")      GENERAL APPEARANCE: Alert, no apparent distress  HEAD: atraumatic  EYES: PERRL, EOMI  HENT: Normal external exam  NECK: no adenopathy or masses, trachea is midline  RESP: lungs clear to auscultation - no rales, rhonchi or wheezes  CV: regular rate and " rhythm, no significant murmurs or rubs  ABDOMEN: soft, nontender, no CVA tenderness no masses with normal bowel sounds  NEURO: mentation and speech normal; no focal deficits    Labs/Imaging Results:  Results for orders placed or performed during the hospital encounter of 01/01/19 (from the past 24 hour(s))   UA reflex to Microscopic   Result Value Ref Range    Color Urine Gaby     Appearance Urine Cloudy     Glucose Urine Negative NEG^Negative mg/dL    Bilirubin Urine Negative NEG^Negative    Ketones Urine Negative NEG^Negative mg/dL    Specific Gravity Urine 1.016 1.003 - 1.035    Blood Urine Large (A) NEG^Negative    pH Urine 5.0 5.0 - 7.0 pH    Protein Albumin Urine 100 (A) NEG^Negative mg/dL    Urobilinogen mg/dL 0.0 0.0 - 2.0 mg/dL    Nitrite Urine Negative NEG^Negative    Leukocyte Esterase Urine Moderate (A) NEG^Negative    Source Midstream Urine     RBC Urine >182 (H) 0 - 2 /HPF    WBC Urine >182 (H) 0 - 5 /HPF    WBC Clumps Present (A) NEG^Negative /HPF    Yeast Urine Many (A) NEG^Negative /HPF             Impression:  Final diagnoses:   Urinary tract infection with hematuria         ED Course & Medical Decision Making (Plan):  Monika Luis is a 63 year old female with acute onset of dysuria associated with gross hematuria.  Symptoms started at 1:00 this morning abruptly with blood in the urine becoming darker red by 3:00 this morning.  She does not have any other signs of upper urinary tract infection such as fever, chills, flank pain.  Patient's urine was grossly bloody.  Urinalysis and urine culture were ordered.  Patient was advised to push fluids.  Begin Cipro 250 mg twice a day for 7 days and Pyridium up to 200 mg 3 times a day for pain.    Written after-visit summary and instructions were given at the time of discharge.          Follow Up Plan:  Your primary clinic provider    In 3 days  if not improving    [facility] Emergency Department  [address] [place]  [address]  [phone]    If symptoms worsen        Discharge Meds: Cipro 250 mg twice a day for 7 days, Pyridium 200 mg up to 3 times a day for 2 days as needed for pain        This note was completed in part using Dragon voice recognition, and may contain word and grammatical errors.        Noemi Land MD  01/01/19 0714

## 2019-01-01 NOTE — ED AVS SNAPSHOT
[facility] Emergency Department  [address] [place] [address]  Phone:  [phone]  Fax:  [phone]                                    Monika Luis   MRN: [id number]    Department:  [facility] Emergency Department   Date of Visit:  1/1/2019           After Visit Summary Signature Page    I have received my discharge instructions, and my questions have been answered. I have discussed any challenges I see with this plan with the nurse or doctor.    ..........................................................................................................................................  Patient/Patient Representative Signature      ..........................................................................................................................................  Patient Representative Print Name and Relationship to Patient    ..................................................               ................................................  Date                                   Time    ..........................................................................................................................................  Reviewed by Signature/Title    ...................................................              ..............................................  Date                                               Time          22EPIC Rev 08/18

## 2019-01-01 NOTE — RESULT ENCOUNTER NOTE
Emergency Dept/Urgent Care discharge antibiotic (if prescribed): Ciprofloxacin (Cipro) 250 mg tablet, 1 tablet (250 mg) by mouth 2 times daily for 7 days.  Date of Rx (if applicable):  1/1/18  No changes in treatment per Urine culture protocol.

## 2019-01-01 NOTE — TELEPHONE ENCOUNTER
"Patient calling reporting \"I have a bloody bladder infection.\" Symptoms starting at 130 a.m. With dysuria. Reporting bright red blood with voiding. Afebrile. Urinary frequency. Denies back or abdominal pain.    Per guidelines advised to be seen with in 24 hours. Patient will come into [place] ER now. Caller verbalized understanding. Denies further questions.    Dayanna Hayward RN  [facility]         Reason for Disposition    Pain or burning with passing urine    Additional Information    Negative: Shock suspected (e.g., cold/pale/clammy skin, too weak to stand, low BP, rapid pulse)    Negative: Sounds like a life-threatening emergency to the triager    Negative: Urinary catheter, questions about    Negative: Recent back or abdominal injury    Negative: Recent genital injury    Negative: [1] Unable to urinate (or only a few drops) > 4 hours AND [2] bladder feels very full (e.g., palpable bladder or strong urge to urinate)    Negative: Passing pure blood or large blood clots (i.e., size > a dime) (Exception: chrissy or small strands)    Negative: Fever > 100.5 F (38.1 C)    Negative: Patient sounds very sick or weak to the triager    Negative: Known sickle cell disease    Negative: Taking Coumadin (warfarin) or other strong blood thinner, or known bleeding disorder (e.g., thrombocytopenia)    Negative: Side (flank) or back pain present    Protocols used: URINE - BLOOD INADULT-      "

## 2019-01-01 NOTE — DISCHARGE INSTRUCTIONS
Thank you for giving us the opportunity to see you. The impression is that you have an acute urinary tract infection with hematuria.    Take Cipro 250 mg twice a day for 7 days.  Add Pyridium 200 mg up to 3 times a day as needed to help with pain.    Drink plenty of water throughout the day.    The final results of your urine culture is pending.  We will call you if your plan of care needs to change.     If you are not seeing an improvement within 3 days, please follow up with your primary care provider or clinic.     After discharge, please closely monitor for any new or worsening symptoms. Return to the Emergency Department at any time if your symptoms worsen.

## 2019-01-03 NOTE — RESULT ENCOUNTER NOTE
Await final culture report per [place] ED Lab Result protocol.  RN confirmed Patient was prescribed antibiotic from ED visit.

## 2019-01-04 ENCOUNTER — TELEPHONE (OUTPATIENT)
Dept: EMERGENCY MEDICINE | Facility: CLINIC | Age: 64
End: 2019-01-04

## 2019-01-04 DIAGNOSIS — N39.0 URINARY TRACT INFECTION: ICD-10-CM

## 2019-01-04 LAB
BACTERIA SPEC CULT: ABNORMAL
Lab: ABNORMAL
SPECIMEN SOURCE: ABNORMAL

## 2019-01-04 NOTE — TELEPHONE ENCOUNTER
[facility] Emergency Department Lab result notification [Adult-Female]    [place] ED lab result protocol used  Urine Culture    Reason for call  Notify of lab results, assess symptoms,  review ED providers recommendations/discharge instructions (if necessary) and advise per ED lab result f/u protocol    Lab Result (including Rx patient on, if applicable)  Final Urine Culture Report on 1/4/19  Emergency Dept/Urgent Care discharge antibiotic prescribed: Ciprofloxacin (Cipro) 250 mg tablet, 1 tablet (250 mg) by mouth 2 times daily for 7 days  #1. Bacteria, 50,000 to 100,000 colonies/ml Escherichia coli,  is [RESISTANT] to antibiotic.   Change in treatment as per [place] ED Lab result protocol.    Information table from ED Provider visit on 1/1/19  Symptoms reported at ED visit (Chief complaint, HPI) Monika Luis is a 63 year old female presenting with abrupt onset of gross hematuria with dysuria at around 130 this morning.  Patient states that she is able to tell when an infection is about the start but it is somewhat vague.  She sensed it coming on last night, but woke up at around 1 AM with hematuria, and at 3:00 with more bloody urine.  She denies any fever, chills, nausea, vomiting, back or flank pain.  She had a mild fever last Wednesday related to an upper respiratory infection which is improving.  Her last urinary tract infection was a year and a half ago.  She has not found any triggers   Significant Medical hx, if applicable (i.e. CKD, diabetes) None   Allergies Allergies   Allergen Reactions     Contrast Dye Anaphylaxis      Weight, if applicable Wt Readings from Last 2 Encounters:   01/01/19 53.1 kg (117 lb)      Coumadin/Warfarin [Yes /No] No   Creatinine Level (mg/dl) No results found for: CR   Creatinine clearance (ml/min), if applicable Creatinine clearance cannot be calculated (No order found.)   Pregnant (Yes/No/NA) No   Breastfeeding (Yes/No/NA) No   ED providers Impression and  "Plan (applicable information) Monika Luis is a 63 year old female with acute onset of dysuria associated with gross hematuria.  Symptoms started at 1:00 this morning abruptly with blood in the urine becoming darker red by 3:00 this morning.  She does not have any other signs of upper urinary tract infection such as fever, chills, flank pain.  Patient's urine was grossly bloody.  Urinalysis and urine culture were ordered.  Patient was advised to push fluids.  Begin Cipro 250 mg twice a day for 7 days and Pyridium up to 200 mg 3 times a day for pain   ED diagnosis Urinary tract infection with hematuria   ED provider Noemi Land MD      RN Assessment (Patient s current Symptoms), include time called.  [Insert Left message here if message left]  \"I'm feeling good, last night it started burning again\", but \"now I feel good\".   Questions answered, is feeling \"much better\" compared to ED visit.      RN Recommendations/Instructions per [place] ED lab result protocol  Patient notified of lab result and treatment recommendations.  Rx for Augmentin sent to [Pharmacy - [facility]  RN reviewed information about stopping Cipro once Augmentin obtained.  *   Please Contact your PCP clinic or return to the Emergency department if your:    Symptoms return.    Symptoms do not resolve after completing antibiotic.    Symptoms worsen or other concerning symptom's.    PCP follow-up Questions asked: YES       Tabatha Colbert RN    [facility] RN  Lung Nodule and ED Lab Results F/U RN  Epic pool (ED late result f/u RN) : P [id number]   # [phone]    Copy of Lab result   Order   Urine Culture [[id number]] (Order [id number])   Exam Information     Exam Date Exam Time Accession # Results    1/1/19  6:04 AM [id number]    Component Results     Specimen Information: Midstream Urine        Component Collected Lab   Specimen Description 01/01/2019  6:04    Midstream Urine    Special Requests 01/01/2019  6:04 AM 75 "   Specimen received in preservative    Culture Micro (Abnormal) 01/01/2019  6:04    Abnormal   50,000 to 100,000 colonies/mL   Escherichia coli     Susceptibility     Escherichia coli (1)     Antibiotic Interpretation Sensitivity Method Status   AMPICILLIN Sensitive <=2 ug/mL BREONNA Final   CEFAZOLIN Sensitive <=4 ug/mL BREONNA Final    Cefazolin BREONNA breakpoints are for the treatment of uncomplicated urinary tract   infections.  For the treatment of systemic infections, please contact the   laboratory for additional testing.   CEFOXITIN Sensitive <=4 ug/mL BREONNA Final   CEFTAZIDIME Sensitive <=1 ug/mL BREONNA Final   CEFTRIAXONE Sensitive <=1 ug/mL BREONNA Final   CIPROFLOXACIN Resistant >=4 ug/mL BREONNA Final   GENTAMICIN Sensitive <=1 ug/mL BREONNA Final   LEVOFLOXACIN Resistant >=8 ug/mL BREONNA Final   NITROFURANTOIN Sensitive <=16 ug/mL BREONNA Final   TOBRAMYCIN Sensitive <=1 ug/mL BREONNA Final   Trimethoprim/Sulfa Resistant >=16/304 ug/mL BREONNA Final   AMPICILLIN/SULBACTAM Sensitive <=2 ug/mL BREONNA Final   Piperacillin/Tazo Sensitive <=4 ug/mL BREONNA Final   CEFEPIME Sensitive <=1 ug/mL BREONNA Final

## 2019-02-21 NOTE — PROGRESS NOTES
ORTHOPEDIC CLINIC CONSULT      Monika Luis is a 63 year old female who is seen in consultation at the request of .  History of Present illness:  Monika presents for evaluation of:   1.) rt middle finger rt wrist   Onset:  Over a year for finger wrist about a month   patient states these timelines represent when the pain became more significant.   She jammed her middle finger approximately 78 years ago with pain ever since.  The right wrist pain began 1.5 years ago after hit by ceiling fan and intermittent in pain.  Difficulty lifting heavier items.  Symptoms brought on by .   Character:  Jammed finger, wrist hit by ceiling fan.    Progression of symptoms:  worse.    Previous similar pain: no .   Pain Level:  0/10.   Previous treatments:  ice and deep heating rub.  Currently on Blood thinners? np  Diagnosis of Diabetes? no    Patient's past medical, surgical, social and family histories reviewed.     No past medical history on file.    No past surgical history on file.    Home Medications:  Prior to Admission medications    Medication Sig Start Date End Date Taking? Authorizing Provider   amoxicillin-clavulanate (AUGMENTIN) 875-125 MG tablet Take 1 tablet by mouth 2 times daily for 14 days 1/4/19 1/18/19  Noemi Land MD   DiphenhydrAMINE HCl (BENADRYL PO) Take 50 mg by mouth daily as needed    Reported, Patient   phenazopyridine (PYRIDIUM) 200 MG tablet Take 1 tablet (200 mg) by mouth 3 times daily as needed for irritation (painful urination, Be aware this medication will turn your urine a bright orange color) 1/1/19 1/3/19  Noemi Land MD   Tea Tree Oil OIL Combined with clove    Reported, Patient   triamcinolone (KENALOG) 0.1 % cream Apply sparingly to affected area two times daily as needed 10/30/18   Alan Moyer, DO       Allergies   Allergen Reactions     Contrast Dye Anaphylaxis       Social History     Occupational History     Not on file   Tobacco Use     Smoking status: Never Smoker  "    Smokeless tobacco: Never Used   Substance and Sexual Activity     Alcohol use: Not on file     Drug use: Not on file     Sexual activity: Not on file       No family history on file.    REVIEW OF SYSTEMS    General: Negative for fever or fatigue    Psych:  negative anxiety or depression     Ophthalmic:  Corrective lenses?  No    ENT:  Hearing difficulty? No    CV: negative for chest pain, venous insufficiency, no history of MI or stroke    Endocrine:  negative diabetes     Urology:  negative kidney disease    Resp:  Normal respiratory effort, no history of pulmonary disease or asthma    Skin: negative for cuts/sores or redness    Musculoskeletal: as above    Neurologic:negative for numbness/tingling    Hematologic: negative for bleeding disorder, does not use of prescription anticoagulants       Physical Exam:    Vitals: Ht 1.626 m (5' 4\")   Wt 53.1 kg (117 lb)   BMI 20.08 kg/m    BMI= Body mass index is 20.08 kg/m .    GENERAL APPEARANCE:   Healthy, alert, no distress    SKIN:  negative for suspicious lesions or rashes    NEURO: Normal strength and tone, mentation intact and speech normal    PSYCH:   Mentation appears Normal and affect normal/bright    RESPIRATORY: negative for respiratory distress.    EYES: negative for Conjunctivitis    Cardiovascular: no Edema                radial Present                Fingers warm and well perfused, brisk capillary refill.      GAIT: na    JOINT/EXTREMITIES:  Right middle finger with visible swelling PIP.  No redness present.  Slight ulnar deviation.  No redness or swelling of right wrist  Pain on palpation over CMC  Patient is able to flex and extend all digits.  She has visible Heberden's nodes bilateral index fingers with the left greater than right.  Left is also has a flexion of the DIP at approximately 45 degrees.  Movement of the patient's right thumb does add some discomfort and some grinding was palpated.  Palpation of the joint causes " discomfort.    Radiographs: X-ray images of the PIP of the middle finger of the right hand reveals osteophytes present and narrowing.  The nearby index finger also has smaller osteophytes present and narrowing.  Right wrist x-ray reveals no fractures.  There is no significant narrowing of the CMC joint however it does appear subluxed mildly.  Independent visualization of the films was made.     Impression:      ICD-10-CM    1. 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 Right wrist pain M25.531 XR Wrist Right G/E 3 Views   3. Pain of right middle finger M79.644 XR Finger Right G/E 2 Views   4. Arthritis of carpometacarpal (CMC) joint of right thumb M18.11      Patient with right middle finger pain as well as right wrist pain likely aggravated from listed agitating events.  X-ray imaging as well as clinical exam with the diagnosis of arthritis.  Patient does have Heberden's nodes present.    Plan:  Patient was advised of the above diagnosis.  In regards to her middle finger, she is advised that options are limited.  She is offered use of oral anti-inflammatories or cortisone injection.  She does have an analgesic balm she has been using. Some surgical options of fusion were addressed for the finger as well .   In regards to her CMC arthritis she is advised this is a common disorder that if conservative options of medication or injection is not helpful than surgical options available.     Patient is advised that conditions may be expedited by injury but usually a genetic predisposition is present.  Patient denies need for aggressive treatments.    BP Readings from Last 1 Encounters:   01/01/19 (!) 144/94     BP noted to be elevated today in office.  Patient to follow up with Primary Care provider regarding elevated blood pressure.    Patient is evaluated with and plan developed in conjunction with Dr. De La Cruz    Scribed by  Lana Davidson PA-C   2/25/2019  3:51 PM        I attest I have seen and  evaluated the patient.  I agree with above impression and plan.    Kar De La Cruz MD

## 2019-02-25 ENCOUNTER — OFFICE VISIT (OUTPATIENT)
Dept: ORTHOPEDICS | Facility: CLINIC | Age: 64
End: 2019-02-25
Payer: COMMERCIAL

## 2019-02-25 ENCOUNTER — ANCILLARY PROCEDURE (OUTPATIENT)
Dept: GENERAL RADIOLOGY | Facility: CLINIC | Age: 64
End: 2019-02-25
Attending: ORTHOPAEDIC SURGERY
Payer: COMMERCIAL

## 2019-02-25 ENCOUNTER — ANCILLARY PROCEDURE (OUTPATIENT)
Dept: GENERAL RADIOLOGY | Facility: CLINIC | Age: 64
End: 2019-02-25
Attending: PHYSICIAN ASSISTANT
Payer: COMMERCIAL

## 2019-02-25 VITALS — BODY MASS INDEX: 19.97 KG/M2 | WEIGHT: 117 LBS | HEIGHT: 64 IN

## 2019-02-25 DIAGNOSIS — M79.644 PAIN OF RIGHT MIDDLE FINGER: ICD-10-CM

## 2019-02-25 DIAGNOSIS — M25.531 RIGHT WRIST PAIN: ICD-10-CM

## 2019-02-25 DIAGNOSIS — M18.11 ARTHRITIS OF CARPOMETACARPAL (CMC) JOINT OF RIGHT THUMB: ICD-10-CM

## 2019-02-25 DIAGNOSIS — M19.041 OSTEOARTHRITIS OF FINGER OF RIGHT HAND: Primary | ICD-10-CM

## 2019-02-25 PROCEDURE — 99203 OFFICE O/P NEW LOW 30 MIN: CPT | Performed by: ORTHOPAEDIC SURGERY

## 2019-02-25 PROCEDURE — 73140 X-RAY EXAM OF FINGER(S): CPT | Mod: TC

## 2019-02-25 PROCEDURE — 73110 X-RAY EXAM OF WRIST: CPT | Mod: TC

## 2019-02-25 ASSESSMENT — PAIN SCALES - GENERAL: PAINLEVEL: NO PAIN (0)

## 2019-02-25 ASSESSMENT — MIFFLIN-ST. JEOR: SCORE: 1070.71

## 2019-02-25 NOTE — LETTER
2/25/2019         RE: Monika Luis  [address]  [facility] [address]        Dear Colleague,    Thank you for referring your patient, Monika Luis, to the [facility]. Please see a copy of my visit note below.    ORTHOPEDIC CLINIC CONSULT      Monika Luis is a 63 year old female who is seen in consultation at the request of .  History of Present illness:  Monika presents for evaluation of:   1.) rt middle finger rt wrist   Onset:  Over a year for finger wrist about a month   patient states these timelines represent when the pain became more significant.   She jammed her middle finger approximately 78 years ago with pain ever since.  The right wrist pain began 1.5 years ago after hit by ceiling fan and intermittent in pain.  Difficulty lifting heavier items.  Symptoms brought on by .   Character:  Jammed finger, wrist hit by ceiling fan.    Progression of symptoms:  worse.    Previous similar pain: no .   Pain Level:  0/10.   Previous treatments:  ice and deep heating rub.  Currently on Blood thinners? np  Diagnosis of Diabetes? no    Patient's past medical, surgical, social and family histories reviewed.     No past medical history on file.    No past surgical history on file.    Home Medications:  Prior to Admission medications    Medication Sig Start Date End Date Taking? Authorizing Provider   amoxicillin-clavulanate (AUGMENTIN) 875-125 MG tablet Take 1 tablet by mouth 2 times daily for 14 days 1/4/19 1/18/19  Noemi Land MD   DiphenhydrAMINE HCl (BENADRYL PO) Take 50 mg by mouth daily as needed    Reported, Patient   phenazopyridine (PYRIDIUM) 200 MG tablet Take 1 tablet (200 mg) by mouth 3 times daily as needed for irritation (painful urination, Be aware this medication will turn your urine a bright orange color) 1/1/19 1/3/19  Noemi Land MD   Tea Tree Oil OIL Combined with clove    Reported, Patient   triamcinolone (KENALOG) 0.1 % cream Apply sparingly to  "affected area two times daily as needed 10/30/18   Alan Moyer, DO       Allergies   Allergen Reactions     Contrast Dye Anaphylaxis       Social History     Occupational History     Not on file   Tobacco Use     Smoking status: Never Smoker     Smokeless tobacco: Never Used   Substance and Sexual Activity     Alcohol use: Not on file     Drug use: Not on file     Sexual activity: Not on file       No family history on file.    REVIEW OF SYSTEMS    General: Negative for fever or fatigue    Psych:  negative anxiety or depression     Ophthalmic:  Corrective lenses?  No    ENT:  Hearing difficulty? No    CV: negative for chest pain, venous insufficiency, no history of MI or stroke    Endocrine:  negative diabetes     Urology:  negative kidney disease    Resp:  Normal respiratory effort, no history of pulmonary disease or asthma    Skin: negative for cuts/sores or redness    Musculoskeletal: as above    Neurologic:negative for numbness/tingling    Hematologic: negative for bleeding disorder, does not use of prescription anticoagulants       Physical Exam:    Vitals: Ht 1.626 m (5' 4\")   Wt 53.1 kg (117 lb)   BMI 20.08 kg/m     BMI= Body mass index is 20.08 kg/m .    GENERAL APPEARANCE:   Healthy, alert, no distress    SKIN:  negative for suspicious lesions or rashes    NEURO: Normal strength and tone, mentation intact and speech normal    PSYCH:   Mentation appears Normal and affect normal/bright    RESPIRATORY: negative for respiratory distress.    EYES: negative for Conjunctivitis    Cardiovascular: no Edema                radial Present                Fingers warm and well perfused, brisk capillary refill.      GAIT: na    JOINT/EXTREMITIES:  Right middle finger with visible swelling PIP.  No redness present.  Slight ulnar deviation.  No redness or swelling of right wrist  Pain on palpation over CMC  Patient is able to flex and extend all digits.  She has visible Heberden's nodes bilateral index fingers with " the left greater than right.  Left is also has a flexion of the DIP at approximately 45 degrees.  Movement of the patient's right thumb does add some discomfort and some grinding was palpated.  Palpation of the joint causes discomfort.    Radiographs: X-ray images of the PIP of the middle finger of the right hand reveals osteophytes present and narrowing.  The nearby index finger also has smaller osteophytes present and narrowing.  Right wrist x-ray reveals no fractures.  There is no significant narrowing of the CMC joint however it does appear subluxed mildly.  Independent visualization of the films was made.     Impression:      ICD-10-CM    1. 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 Right wrist pain M25.531 XR Wrist Right G/E 3 Views   3. Pain of right middle finger M79.644 XR Finger Right G/E 2 Views   4. Arthritis of carpometacarpal (CMC) joint of right thumb M18.11      Patient with right middle finger pain as well as right wrist pain likely aggravated from listed agitating events.  X-ray imaging as well as clinical exam with the diagnosis of arthritis.  Patient does have Heberden's nodes present.    Plan:  Patient was advised of the above diagnosis.  In regards to her middle finger, she is advised that options are limited.  She is offered use of oral anti-inflammatories or cortisone injection.  She does have an analgesic balm she has been using. Some surgical options of fusion were addressed for the finger as well .   In regards to her CMC arthritis she is advised this is a common disorder that if conservative options of medication or injection is not helpful than surgical options available.     Patient is advised that conditions may be expedited by injury but usually a genetic predisposition is present.  Patient denies need for aggressive treatments.    BP Readings from Last 1 Encounters:   01/01/19 (!) 144/94     BP noted to be elevated today in office.  Patient to follow up with  Primary Care provider regarding elevated blood pressure.    Patient is evaluated with and plan developed in conjunction with Dr. De La Cruz    Scribed by  Lana Davidson PA-C   2/25/2019  3:51 PM        I attest I have seen and evaluated the patient.  I agree with above impression and plan.    Kar De La Cruz MD                  Again, thank you for allowing me to participate in the care of your patient.        Sincerely,        Kar De La Cruz MD

## 2019-05-30 ENCOUNTER — OFFICE VISIT (OUTPATIENT)
Dept: URGENT CARE | Facility: RETAIL CLINIC | Age: 64
End: 2019-05-30
Payer: COMMERCIAL

## 2019-05-30 VITALS — SYSTOLIC BLOOD PRESSURE: 117 MMHG | HEART RATE: 71 BPM | DIASTOLIC BLOOD PRESSURE: 63 MMHG | TEMPERATURE: 99 F

## 2019-05-30 DIAGNOSIS — R30.0 DYSURIA: ICD-10-CM

## 2019-05-30 DIAGNOSIS — N39.0 ACUTE LOWER UTI: Primary | ICD-10-CM

## 2019-05-30 LAB
BILIRUB UR QL: ABNORMAL
CLARITY: ABNORMAL
COLOR UR: YELLOW
GLUCOSE URINE: ABNORMAL MG/DL
HGB UR QL: ABNORMAL
KETONES UR QL: ABNORMAL MG/DL
NITRITE UR QL STRIP: ABNORMAL
PH UR STRIP: 5.5 PH (ref 5–7)
PROT UR QL: ABNORMAL MG/DL
SP GR UR STRIP: 1.02 (ref 1–1.03)
SPECIMEN VOL UR: ABNORMAL ML
UROBILINOGEN UR QL STRIP: 0.2 EU/DL (ref 0.2–1)
WBC #/AREA URNS HPF: ABNORMAL /[HPF]

## 2019-05-30 PROCEDURE — 81003 URINALYSIS AUTO W/O SCOPE: CPT | Performed by: PHYSICIAN ASSISTANT

## 2019-05-30 PROCEDURE — 87086 URINE CULTURE/COLONY COUNT: CPT | Performed by: PHYSICIAN ASSISTANT

## 2019-05-30 PROCEDURE — 99213 OFFICE O/P EST LOW 20 MIN: CPT | Performed by: PHYSICIAN ASSISTANT

## 2019-05-30 RX ORDER — SULFAMETHOXAZOLE/TRIMETHOPRIM 800-160 MG
1 TABLET ORAL 2 TIMES DAILY
Qty: 6 TABLET | Refills: 0 | Status: SHIPPED | OUTPATIENT
Start: 2019-05-30 | End: 2019-06-02

## 2019-05-30 NOTE — PATIENT INSTRUCTIONS
Take full course of antibiotics- Bactrim twice daily for 3 days.  Urine culture pending, we will call you only if culture shows resistance and change of antibiotic is required or if no growth to stop antibiotics and to follow-up with your primary care provider.  May use over the counter AZO pain relief or Uristat (phenazopyridine) for urinary burning but do not use for 24 hours before future urine tests.  Drink plenty of fluids. Limit caffeine and alcohol as these are bladder irritants.  May take tylenol or ibuprofen as needed for discomfort.   If you develop any vomiting, high fevers or lower back pain, these can be signs of a kidney infection and you should be seen in urgent care or in the ER.  Prevention of future infections by drinking cranberry juice, urination after intercourse and wiping from front to back after using the toilet.  Please follow up with primary care provider if symptoms return, if you're not improving, worsening or new symptoms or for any adverse reactions to medications.

## 2019-05-30 NOTE — PROGRESS NOTES
"Chief Complaint   Patient presents with     UTI     mild symptoms 4 days; frequency, heaviness in pelvic area, mild pain; drinking alot of fluids, using essential oils     Chills     mild, off and on     SUBJECTIVE:   Monika Luis is a 64 year old female who  presents today for a possible UTI.   She has symptoms of dysuria, urgency, frequency and suprapubic \"heaviness\" that have been going on for 4 days.    Symptom timing described as gradual onset and mild and moderate in severity.    This patient does have a history of urinary tract infections.  There is no history of hematuria, flank pain, fever, chills, nausea or vomiting.   Patient denies vaginal discharge, vaginal odor and vaginal itching     No past medical history on file.  Current Outpatient Medications   Medication Sig Dispense Refill     sulfamethoxazole-trimethoprim (BACTRIM DS/SEPTRA DS) 800-160 MG tablet Take 1 tablet by mouth 2 times daily for 3 days 6 tablet 0     DiphenhydrAMINE HCl (BENADRYL PO) Take 50 mg by mouth daily as needed       Tea Tree Oil OIL Combined with clove       triamcinolone (KENALOG) 0.1 % cream Apply sparingly to affected area two times daily as needed 454 g 1     Social History     Tobacco Use     Smoking status: Never Smoker     Smokeless tobacco: Never Used   Substance Use Topics     Alcohol use: Not on file     Allergies   Allergen Reactions     Contrast Dye Anaphylaxis     Sandalwood Fragrance Oil Hives     Face turns red, irratation     Cat Hair Extract      Dust Mites      Mold      REVIEW OF SYSTEMS  General: NEGATIVE for fever, chills, headaches.  : POSITIVE for dysuria, frequency, urgency, suprapubic pressure and history of urinary tract infection. NEGATIVE for vaginal discharge, flank pain.    OBJECTIVE:  /63 (BP Location: Left arm)   Pulse 71   Temp 99  F (37.2  C) (Oral)   GENERAL APPEARANCE: healthy, alert and no distress  RESP: lungs clear to auscultation - no rales, rhonchi or wheezes  CV: regular " rates and rhythm, normal S1 S2, no murmur noted  ABDOMEN:  soft, mild suprapubic tenderness  BACK: No CVA tenderness    UA:  Leukocytes: small  Nitrites: negative  Blood: moderate    ASSESSMENT:    ICD-10-CM    1. Acute lower UTI N39.0 sulfamethoxazole-trimethoprim (BACTRIM DS/SEPTRA DS) 800-160 MG tablet   2. Dysuria R30.0 Urine Culture Aerobic Bacterial     UA without Microscopic     PLAN:   Patient Instructions   Take full course of antibiotics- Bactrim twice daily for 3 days.  Urine culture pending, we will call you only if culture shows resistance and change of antibiotic is required or if no growth to stop antibiotics and to follow-up with your primary care provider.  May use over the counter AZO pain relief or Uristat (phenazopyridine) for urinary burning but do not use for 24 hours before future urine tests.  Drink plenty of fluids. Limit caffeine and alcohol as these are bladder irritants.  May take tylenol or ibuprofen as needed for discomfort.   If you develop any vomiting, high fevers or lower back pain, these can be signs of a kidney infection and you should be seen in urgent care or in the ER.  Prevention of future infections by drinking cranberry juice, urination after intercourse and wiping from front to back after using the toilet.  Please follow up with primary care provider if symptoms return, if you're not improving, worsening or new symptoms or for any adverse reactions to medications.     Follow up with primary care provider with any problems, questions or concerns or if symptoms worsen or fail to improve. Patient agreed to plan and verbalized understanding.    Christie Carcamo PA-C  [facility] [place]

## 2019-05-31 LAB
BACTERIA SPEC CULT: NORMAL
Lab: NORMAL
SPECIMEN SOURCE: NORMAL

## 2019-06-01 ENCOUNTER — TELEPHONE (OUTPATIENT)
Dept: URGENT CARE | Facility: RETAIL CLINIC | Age: 64
End: 2019-06-01

## 2019-06-01 NOTE — TELEPHONE ENCOUNTER
"Urine culture final \"<10,000 colonies/mL urogenital juan.\"    I left a voicemail today on patient's cell # to return call back to [facility].     When patient calls back, please inform her:  1. Her urine culture showed that she did not have a UTI.   2. There was a small amt of bacteria that is normally found in the vaginal area.   3. Advise that antibiotics are not needed since she does not have a UTI, so please stop taking any antibiotics if she has any remaining doses left of her 3 day course started on Thursday.  4. If she is still having any symptoms, please ask her to be seen for an appointment at her primary care clinic.    GOVIND Amanda [facility]   "

## 2019-06-02 NOTE — TELEPHONE ENCOUNTER
I left a VM on patient's cell # [facility]. If she was feeling fine, I advised that she did not need to return the call.    If she returns the call, please relay the message in my below note:     When patient calls back, please inform her:  1. Her urine culture showed that she did not have a UTI.   2. There was a small amt of bacteria that is normally found in the vaginal area.   3. Advise that antibiotics are not needed since she does not have a UTI. She has likely finished this 3 day course already in the past day or so, but advise her that antibiotics are not needed, since her urine showed no infection.  4. If she is still having any symptoms, please ask her to be seen for an appointment at her primary care clinic.     GOVIND Amanda [facility] [place]

## 2020-04-06 ENCOUNTER — TELEPHONE (OUTPATIENT)
Dept: DERMATOLOGY | Facility: CLINIC | Age: 65
End: 2020-04-06

## 2020-04-06 NOTE — TELEPHONE ENCOUNTER
"Patient reports mole on her shoulder that has been present for 4-5 weeks.  It started out flat and has become raised.  It is brown in color with a \"red clover leaf pattern half way around it.\"  It occasionally itches.  She has no personal hx of skin cancer.  She has not seen a dermatologist before.    Video visit scheduled for 4/8/20.  MyChart activation initiated.  Once patient is signed up for [employer] I will send her the video visit instructions along with information on how to take a photo.    Tracey Carrasco RN    "

## 2020-04-08 ENCOUNTER — VIRTUAL VISIT (OUTPATIENT)
Dept: DERMATOLOGY | Facility: CLINIC | Age: 65
End: 2020-04-08
Payer: COMMERCIAL

## 2020-04-08 DIAGNOSIS — L82.0 INFLAMED SEBORRHEIC KERATOSIS: Primary | ICD-10-CM

## 2020-04-08 PROCEDURE — 99202 OFFICE O/P NEW SF 15 MIN: CPT | Mod: GT | Performed by: DERMATOLOGY

## 2020-04-08 NOTE — PATIENT INSTRUCTIONS
[facility] Teledermatology Visit    Thank you for allowing us to participate in your care. Your findings, instructions and follow-up plan are as follows:    Diagnosis: Inflamed seborrheic keratosis on the right shoulder  -Changes to watch out for: if lesion bleeds or becomes tender to touch, let us know  -Stop the essential oils, they may be causing a reaction and making the spot itchier  -Ok to apply Vaseline to keep the area hydrated or over the counter hydrocortisone 1% cream as needed to help with itch  -Let's do a full skin check after pandemic has passed. Call my office to schedule once things are back to normal.         When should I call my doctor?    If you are worsening or not improving, please, contact us or seek urgent care as noted below.     Who should I call with questions?    [facility]: [phone]     [facility]: [phone]    If this is a medical emergency and you are unable to reach an ER, Call [phone]

## 2020-04-08 NOTE — PROGRESS NOTES
[facility]: Method of transmission:  [employer] ( Invitation sent by: Send to e-mail at: [email or web address])      Impression and Recommendations (Patient Counseled on the Following):  1: Inflamed seborrheic keratosis, right shoulder  -Discussed limitations of photographs. This lesion is most consistent with an inflamed seborrheic keratosis, does not need urgent evaluation in person, but diagnosis should be confirmed in person at some point. Recommended full skin check after pandemic has passed.   -Discussed that if the lesion should bleed or become tender to light touch, this would be concerning  -Recommended stopping essential oils as this can lead to ACD.  -Recommended use of Vaseline to keep the area hydrated or over the counter hydrocortisone 1% cream as needed to help with itch        Follow-up:   Follow-up with dermatology for skin check after pandemic has passed. Earlier for new or changing lesions or rash.      Staff only:    Lana Hernandez MD    Department of Dermatology  [facility]: Phone: [phone], Fax:[phone]  [facility]: Phone: [phone], Fax: [phone]          _____________________________________________________________________________    Dermatology Problem List:  1. SK, right shoulder    Encounter Date: Apr 8, 2020    CC:   Chief Complaint   Patient presents with     Derm Problem     Spot appeared on right shoulder about 6 weeks ago, itchy and red. No personal hx of SC, two sisters, brother and father hx SC.       History of Present Illness:  I have reviewed the teledermatology information and the nursing intake corresponding to this issue. Monika Luis is a 64 year old female who presents via teledermatology for a concerning lesion on the right shoulder.    She notes it has been present for about one month. She  noticed it after returning from a trip. She thought maybe it was a bug bite as it was also itchy. Over time, the appearance changed and it looked less like a bug bite. It was more brown in color. Itch has remained. No tenderness or bleeding. She has been applying essential oils to it. History of a skin reaction to essential oils, but she notes she is able to tolerate in small quantities if diluted typically.       ROS: Patient is generally feeling well today, no other specific skin concerns.    Physical Examination:  General: Well-appearing feamle, appropriately-developed individual.  Skin: Focused examination of the face, right shoulder within the teledermatology photograph(s)* was performed.   -Waxy brown papule with cerebriform like invaginations and surrounding pink erythema on the right shoulder.     Labs: None    Past Medical History:   Patient Active Problem List   Diagnosis     Other atopic dermatitis     Hives     No past medical history on file.  No past surgical history on file.    Social History:  Not assessed.     Family History:  Sisters, brother, and dad have had skin cancer (unknown types)    Medications:  Current Outpatient Medications   Medication     DiphenhydrAMINE HCl (BENADRYL PO)     Tea Tree Oil OIL     triamcinolone (KENALOG) 0.1 % cream     No current facility-administered medications for this visit.           Allergies   Allergen Reactions     Contrast Dye Anaphylaxis     Sandalwood Fragrance Oil Hives     Face turns red, irratation     Cat Hair Extract      Dust Mites      Mold          _____________________________________________________________________________    Teledermatology information:  - Location of patient: Home  - Patient presented as: self referral  - Location of teledermatologist:  ([facility] )  - Reason teledermatology is appropriate:  of National Emergency Regarding Coronavirus disease (COVID 19) Outbreak  - Image quality and interpretability:  "acceptable  - Physician has received verbal consent for a Video/Photos Visit from the patient? Yes  - In-person dermatology visit recommendation: no  - Date of images: 4/9/20  - Service start time:10:01am  - Service end time:10:07am  - Date of report: 04/08/20    Teledermatology Nurse Call for NEW Patients (not seen in last 3 years):     The patient was contacted by phone and we reviewed, \"Due to the coronavirus pandemic, we are calling to reschedule your upcoming visit and offer you a teledermatology visit. This would be assessed by an MD or PABRADEN;  Importantly, \"a teledermatology visit may not be as thorough as an in-person visit, and the quality of the photograph and/or video sent may not be of the same quality as that taken by the dermatology clinic.\" After discussing the risks, benefits, and alternatives, the patient would like to proceed with teledermatology because of National Emergency Regarding Coronavirus disease (COVID 19) Outbreak.\"    This video visit will be conducted via a call between you and your physician/provider via [employer]. We have found that certain health care needs can be provided without the need for an in-person physical exam.  This service lets us provide the care you need with a video conversation.  If a prescription is necessary we can send it directly to your pharmacy.  If lab work is needed we can place an order for that and you can then stop by our lab to have the test done at a later time.If during the course of the call the physician/provider feels a video visit is not appropriate, you will not be charged for this service.    Patient would like the video invitation sent by: Send to e-mail at: [email or web address]     The patient will also send photographs via [employer] for review.       The patient verified the location of the photo/video to be their home address.      For photos, patient told nursing these are already uploaded     The patient was instructed to take photos of all areas " of concern and all areas of any rash.       The patient denied skin pain, fever, mucosal symptoms(lesions, blisters, sores in the mouth, nose, eyes, or genitals)  IF PATIENT ENDORSES ANY OF THESE STOP AND PAGE ON CALL ATTENDING    Additional notes:  Patient summary of issue: below  Location of problem on body: right hsoulder  How long has area/symptoms been present: one month  What makes it better?: Essential oils  What makes it worse?: N/A  Other symptoms include the following: N/A  Which mediations have been tried, for how long, and did they make it better or worse (ex. Triamcinolone, used twice daily for 2 weeks, not improved): Frankincense and peppermint oil.   The patient has not seen a dermatologist in the past.   The patient hasno past medical history of skin cancer  ROS: The patient is generally feeling well.       Nursing tasks completed  -Pharmacy preference was updated.  -The nurse has dropped in the AVS information *(For adults the phrase is umdermhteleavs and for pediatrics it is their own) for the physician to route in the AVS.                                                                                                                                                                                                                         -The patient was told to contact the clinic if they have not received correspondence within 72 hours.     Michelle Rose LPN

## 2021-01-15 ENCOUNTER — HEALTH MAINTENANCE LETTER (OUTPATIENT)
Age: 66
End: 2021-01-15

## 2021-10-24 ENCOUNTER — HEALTH MAINTENANCE LETTER (OUTPATIENT)
Age: 66
End: 2021-10-24

## 2022-02-13 ENCOUNTER — HEALTH MAINTENANCE LETTER (OUTPATIENT)
Age: 67
End: 2022-02-13

## 2022-10-16 ENCOUNTER — HEALTH MAINTENANCE LETTER (OUTPATIENT)
Age: 67
End: 2022-10-16

## 2023-03-26 ENCOUNTER — HEALTH MAINTENANCE LETTER (OUTPATIENT)
Age: 68
End: 2023-03-26

## 2025-05-29 ENCOUNTER — APPOINTMENT (OUTPATIENT)
Dept: URBAN - METROPOLITAN AREA CLINIC 252 | Age: 70
Setting detail: DERMATOLOGY
End: 2025-05-29

## 2025-05-29 VITALS — WEIGHT: 112 LBS | HEIGHT: 64 IN

## 2025-05-29 DIAGNOSIS — D22 MELANOCYTIC NEVI: ICD-10-CM

## 2025-05-29 DIAGNOSIS — L82.1 OTHER SEBORRHEIC KERATOSIS: ICD-10-CM

## 2025-05-29 DIAGNOSIS — L98.8 OTHER SPECIFIED DISORDERS OF THE SKIN AND SUBCUTANEOUS TISSUE: ICD-10-CM

## 2025-05-29 DIAGNOSIS — D18.0 HEMANGIOMA: ICD-10-CM

## 2025-05-29 DIAGNOSIS — L81.4 OTHER MELANIN HYPERPIGMENTATION: ICD-10-CM

## 2025-05-29 DIAGNOSIS — Z71.89 OTHER SPECIFIED COUNSELING: ICD-10-CM

## 2025-05-29 DIAGNOSIS — R20.2 PARESTHESIA OF SKIN: ICD-10-CM

## 2025-05-29 PROBLEM — D22.5 MELANOCYTIC NEVI OF TRUNK: Status: ACTIVE | Noted: 2025-05-29

## 2025-05-29 PROBLEM — D18.01 HEMANGIOMA OF SKIN AND SUBCUTANEOUS TISSUE: Status: ACTIVE | Noted: 2025-05-29

## 2025-05-29 PROBLEM — D22.61 MELANOCYTIC NEVI OF RIGHT UPPER LIMB, INCLUDING SHOULDER: Status: ACTIVE | Noted: 2025-05-29

## 2025-05-29 PROCEDURE — OTHER COUNSELING: OTHER

## 2025-05-29 PROCEDURE — 99203 OFFICE O/P NEW LOW 30 MIN: CPT

## 2025-05-29 PROCEDURE — OTHER MIPS QUALITY: OTHER

## 2025-05-29 ASSESSMENT — LOCATION ZONE DERM
LOCATION ZONE: TRUNK
LOCATION ZONE: ARM
LOCATION ZONE: LIP
LOCATION ZONE: FACE

## 2025-05-29 ASSESSMENT — LOCATION SIMPLE DESCRIPTION DERM
LOCATION SIMPLE: LEFT UPPER BACK
LOCATION SIMPLE: RIGHT UPPER ARM
LOCATION SIMPLE: LEFT CHEEK
LOCATION SIMPLE: ABDOMEN
LOCATION SIMPLE: RIGHT LIP
LOCATION SIMPLE: RIGHT UPPER BACK

## 2025-05-29 ASSESSMENT — LOCATION DETAILED DESCRIPTION DERM
LOCATION DETAILED: LEFT MEDIAL UPPER BACK
LOCATION DETAILED: LEFT MID-UPPER BACK
LOCATION DETAILED: LEFT CENTRAL MALAR CHEEK
LOCATION DETAILED: PERIUMBILICAL SKIN
LOCATION DETAILED: LEFT SUPERIOR UPPER BACK
LOCATION DETAILED: RIGHT MID-UPPER BACK
LOCATION DETAILED: RIGHT INFERIOR VERMILION LIP
LOCATION DETAILED: RIGHT ANTERIOR PROXIMAL UPPER ARM
LOCATION DETAILED: RIGHT SUPERIOR MEDIAL UPPER BACK